# Patient Record
Sex: MALE | Race: WHITE | NOT HISPANIC OR LATINO | Employment: OTHER | URBAN - METROPOLITAN AREA
[De-identification: names, ages, dates, MRNs, and addresses within clinical notes are randomized per-mention and may not be internally consistent; named-entity substitution may affect disease eponyms.]

---

## 2024-10-11 PROBLEM — R14.0 BLOATING: Status: ACTIVE | Noted: 2024-10-11

## 2024-10-11 PROBLEM — R19.7 DIARRHEA IN ADULT PATIENT: Status: ACTIVE | Noted: 2024-10-11

## 2024-10-11 PROCEDURE — 84443 ASSAY THYROID STIM HORMONE: CPT

## 2024-10-11 PROCEDURE — 83516 IMMUNOASSAY NONANTIBODY: CPT

## 2024-10-11 NOTE — H&P (VIEW-ONLY)
"Subjective   Patient ID: Skyler Doan is a 68 y.o. male who was referred by ER providers for New Patient Visit and Abdominal Pain (A lot of gas).    Patient's PCP is Dr. Kirk    PMH: HTN, HLD, prediabetes, bipolar disorder, PTSD    HPI  Patient reports that he has had excessive amounts of gas for years, but worse over the past year. He states that he feels bloated very often. This often occurs after eating in general. He states that he has had a change in stools to diarrhea. This has been happening over the past few months. He states that he is having 3-4 bowel movements daily. No significant nocturnal diarrhea. His PCP prescribed cholestyramine has helped some. He states that he has some generalized abdominal pain, which is cramp like in nature. He denies unexplained weight loss, dysphagia, significant N/V, melena, hematochezia.     Patient was seen in the ER on 7/24/24 for these symptoms. CMP showed elevation of BUN at 26. Lipase, lactate, CBC unrevealing. CT A/P negative for acute findings. Bowel was normal other than diverticulosis.    Social History:  NSAIDs: Denies  Tobacco: Denies  Alcohol: Reports \"often\"  Drug use: Denies     Family History: Denies     Prior GI evaluation:  Colonoscopy 2017: normal     Review of Systems:  Constitutional: No reported fever, chills, or weight loss.  Skin: No reported icterus, lesions, or rash.  Eye: No reported itching, pain, vision changes.  Ear: No reported discharge, hearing loss, or pain.  Nose: No reported congestion, discharge, or epistaxis.  Mouth/throat: No reported dysphagia, hoarseness, or throat pain.  Resp: No reported cough, dyspnea, or wheezing.  Cardiovascular: No reported chest pain, lower extremity edema, or palpitations.   GI: +bloating, abdominal pain, diarrhea  : No reported dysuria, hematuria, or frequency.  Neuro: No reported confusion, memory loss, headaches, or dizziness.  Psych: No reported anxiety, depression, or insomnia.  Musculoskeletal: No " reported arthralgia, joint swelling, or myalgias.  Heme/lymph: No reported easy bleeding or bruising, or swollen lymph nodes.  Endocrine: No reported cold/heat intolerance, polydipsia, or polyuria.     Medications:  Prior to Admission medications    Medication Sig Start Date End Date Taking? Authorizing Provider   acetaminophen (Tylenol) 325 mg tablet Take 1 tablet (325 mg) by mouth every 4 hours if needed for moderate pain (4 - 6). 7/19/16   Historical Provider, MD   aspirin 81 mg chewable tablet Chew 1 tablet (81 mg) once daily. 7/19/16   Historical Provider, MD   atorvastatin (Lipitor) 40 mg tablet Take 1 tablet (40 mg) by mouth once daily. 7/1/24 7/1/25  Virginia M Factor, DO   buPROPion XL (Wellbutrin XL) 300 mg 24 hr tablet Take 1 tablet (300 mg) by mouth once daily in the morning. 3/28/23   Historical Provider, MD   busPIRone (Buspar) 30 mg tablet Take 1 tablet (30 mg) by mouth 2 times a day. 5/3/16   Historical Provider, MD   carvedilol (Coreg) 6.25 mg tablet TAKE ONE TABLET BY MOUTH TWICE DAILY 12/8/23   [facility], DO   cholestyramine (Questran) 4 gram packet Take 1 packet (4 g) by mouth once daily with breakfast. For irritable bowel syndrome 7/25/24 7/25/25  Virginia M Factor, DO   doxepin (SINEquan) 10 mg capsule TAKE ONE CAPSULES BY MOUTH EVERY NIGHT AT BEDTIME AS NEEDED FOR SLEEP 11/21/23   Virginia M Factor, DO   lamoTRIgine (LaMICtal) 200 mg tablet Take 2 tablets (400 mg) by mouth once daily in the morning. Take before meals. 5/3/16   Historical Provider, MD   lamoTRIgine (LaMICtal) 25 mg tablet Take 1 tablet (25 mg) by mouth once daily. 11/17/23   Historical Provider, MD   lisinopriL-hydrochlorothiazide 20-12.5 mg tablet TAKE ONE TABLET BY MOUTH EVERY DAY 12/8/23   Virginia M Factor, DO   mirtazapine (Remeron) 7.5 mg tablet Take 1 tablet (7.5 mg) by mouth once daily at bedtime. 7/15/24   Historical Provider, MD   multivitamin (Multiple Vitamins) tablet Take 1 tablet by mouth once daily.  7/19/16   Historical Provider, MD   sertraline (Zoloft) 100 mg tablet Take 1 tablet (100 mg) by mouth once daily. 7/19/16   Historical Provider, MD   topiramate (Topamax) 200 mg tablet TAKE ONE TABLET BY MOUTH EVERY NIGHT AT BEDTIME 12/8/23   Elaina Kirk DO   traZODone (Desyrel) 300 mg tablet Take 1 tablet (300 mg) by mouth once daily at bedtime. 11/17/23   Historical Provider, MD   traZODone (Desyrel) 50 mg tablet Take 1 tablet (50 mg) by mouth. 11/1/16   Historical Provider, MD       Allergies:  Patient has no known allergies.    Objective   Physical exam:  Constitutional: Well developed, well nourished 68 y.o. year old in no acute distress.   Skin: Warm and dry. Normal turgor. No rash, ulcer, trauma, jaundice.   Eyes: Pupils symmetric and reactive to light.  Respiratory: Clear to auscultation bilaterally. No wheezes, rhonchi, or rales heard.  Cardiovascular: Regular rate and rhythm. S1 and S2 appreciated and normal. No murmur, rub, or gallop heard.   Edema: No edema noted.  GI: Normal bowel sounds. Soft, non-distended, non-tender. No rebound or guarding present. No hepatomegaly or splenomegaly appreciated. Abdominal aorta not palpably abnormal.  Musculoskeletal: Limbs and Joints grossly normal. Full range of motion in major joint.   Neuro: Alert and oriented x 3. Cranial nerves 2-12 grossly intact.   Psych: Normal mood and affect.        Relevant Results Recent labs reviewed in the EMR.    Radiology: Reviewed imaging reviewed in the EMR.  No results found.    Assessment/Plan   Problem List Items Addressed This Visit             ICD-10-CM    Change in bowel habits - Primary R19.4     Patient with change in bowel habits over the past few months to a year. Colonoscopy ordered to ensure no colon polyps or malignancy.         Relevant Orders    C-Reactive Protein    Pancreatic Elastase, Fecal    Calprotectin, Fecal    Tissue Transglutaminase IgA (Completed)    TSH with reflex to Free T4 if abnormal (Completed)     Colonoscopy Diagnostic    Diarrhea in adult patient R19.7     Patient with several episodes of diarrhea daily. Ddx includes celiac disease, inflammatory process (IBD or microscopic colitis), pancreatic insufficiency, thyroid abnormality, IBS. Infection less likely given how long symptoms have been ongoing for. I ordered chronic diarrhea labs and stool studies. Cholestyramine has helped some, recommended fiber supplement and imodium as needed. Colonoscopy ordered as well.         Relevant Orders    C-Reactive Protein    Pancreatic Elastase, Fecal    Calprotectin, Fecal    Tissue Transglutaminase IgA (Completed)    TSH with reflex to Free T4 if abnormal (Completed)    Colonoscopy Diagnostic    Bloating R14.0     May be related to IBS or lactose intolerance. Recommended dairy elimination or lactaid. If work up negative, could consider SIBO testing.         Relevant Orders    C-Reactive Protein    Pancreatic Elastase, Fecal    Calprotectin, Fecal    Tissue Transglutaminase IgA (Completed)    TSH with reflex to Free T4 if abnormal (Completed)    Colonoscopy Diagnostic       Meds to hold: none  Mily William PA-C

## 2024-10-24 ENCOUNTER — LAB (OUTPATIENT)
Dept: LAB | Facility: LAB | Age: 69
End: 2024-10-24
Payer: COMMERCIAL

## 2024-10-24 DIAGNOSIS — R14.0 BLOATING: ICD-10-CM

## 2024-10-24 DIAGNOSIS — R19.7 DIARRHEA IN ADULT PATIENT: ICD-10-CM

## 2024-10-24 DIAGNOSIS — R19.4 CHANGE IN BOWEL HABITS: ICD-10-CM

## 2024-10-24 PROCEDURE — 82653 EL-1 FECAL QUANTITATIVE: CPT

## 2024-10-24 PROCEDURE — 36415 COLL VENOUS BLD VENIPUNCTURE: CPT

## 2024-10-24 PROCEDURE — 83993 ASSAY FOR CALPROTECTIN FECAL: CPT

## 2024-10-29 LAB — CALPROTECTIN STL-MCNT: 32 UG/G

## 2024-11-03 LAB — ELASTASE PANC STL-MCNT: >800 UG/G

## 2024-11-05 ENCOUNTER — PREP FOR PROCEDURE (OUTPATIENT)
Dept: GASTROENTEROLOGY | Facility: CLINIC | Age: 69
End: 2024-11-05
Payer: COMMERCIAL

## 2024-11-06 ENCOUNTER — ANESTHESIA EVENT (OUTPATIENT)
Dept: GASTROENTEROLOGY | Facility: HOSPITAL | Age: 69
End: 2024-11-06
Payer: COMMERCIAL

## 2024-11-08 ENCOUNTER — ANESTHESIA (OUTPATIENT)
Dept: GASTROENTEROLOGY | Facility: HOSPITAL | Age: 69
End: 2024-11-08
Payer: COMMERCIAL

## 2024-11-08 ENCOUNTER — HOSPITAL ENCOUNTER (OUTPATIENT)
Dept: GASTROENTEROLOGY | Facility: HOSPITAL | Age: 69
Discharge: HOME | End: 2024-11-08
Payer: COMMERCIAL

## 2024-11-08 VITALS
RESPIRATION RATE: 15 BRPM | SYSTOLIC BLOOD PRESSURE: 124 MMHG | WEIGHT: 277.78 LBS | OXYGEN SATURATION: 94 % | HEIGHT: 68 IN | BODY MASS INDEX: 42.1 KG/M2 | HEART RATE: 59 BPM | DIASTOLIC BLOOD PRESSURE: 80 MMHG | TEMPERATURE: 98.1 F

## 2024-11-08 DIAGNOSIS — R19.4 CHANGE IN BOWEL HABITS: ICD-10-CM

## 2024-11-08 DIAGNOSIS — R19.7 DIARRHEA IN ADULT PATIENT: ICD-10-CM

## 2024-11-08 DIAGNOSIS — R14.0 BLOATING: ICD-10-CM

## 2024-11-08 PROCEDURE — 7100000010 HC PHASE TWO TIME - EACH INCREMENTAL 1 MINUTE

## 2024-11-08 PROCEDURE — 2720000007 HC OR 272 NO HCPCS

## 2024-11-08 PROCEDURE — 45380 COLONOSCOPY AND BIOPSY: CPT | Performed by: INTERNAL MEDICINE

## 2024-11-08 PROCEDURE — 3700000002 HC GENERAL ANESTHESIA TIME - EACH INCREMENTAL 1 MINUTE

## 2024-11-08 PROCEDURE — 3700000001 HC GENERAL ANESTHESIA TIME - INITIAL BASE CHARGE

## 2024-11-08 PROCEDURE — 45385 COLONOSCOPY W/LESION REMOVAL: CPT | Performed by: INTERNAL MEDICINE

## 2024-11-08 PROCEDURE — 2500000004 HC RX 250 GENERAL PHARMACY W/ HCPCS (ALT 636 FOR OP/ED): Performed by: NURSE ANESTHETIST, CERTIFIED REGISTERED

## 2024-11-08 PROCEDURE — 7100000009 HC PHASE TWO TIME - INITIAL BASE CHARGE

## 2024-11-08 RX ORDER — FENTANYL CITRATE 50 UG/ML
INJECTION, SOLUTION INTRAMUSCULAR; INTRAVENOUS AS NEEDED
Status: DISCONTINUED | OUTPATIENT
Start: 2024-11-08 | End: 2024-11-08

## 2024-11-08 RX ORDER — SODIUM CHLORIDE 0.9 % (FLUSH) 0.9 %
SYRINGE (ML) INJECTION AS NEEDED
Status: DISCONTINUED | OUTPATIENT
Start: 2024-11-08 | End: 2024-11-08

## 2024-11-08 RX ORDER — PROPOFOL 10 MG/ML
INJECTION, EMULSION INTRAVENOUS AS NEEDED
Status: DISCONTINUED | OUTPATIENT
Start: 2024-11-08 | End: 2024-11-08

## 2024-11-08 SDOH — HEALTH STABILITY: MENTAL HEALTH: CURRENT SMOKER: 0

## 2024-11-08 ASSESSMENT — PAIN - FUNCTIONAL ASSESSMENT: PAIN_FUNCTIONAL_ASSESSMENT: 0-10

## 2024-11-08 ASSESSMENT — COLUMBIA-SUICIDE SEVERITY RATING SCALE - C-SSRS
1. IN THE PAST MONTH, HAVE YOU WISHED YOU WERE DEAD OR WISHED YOU COULD GO TO SLEEP AND NOT WAKE UP?: NO
2. HAVE YOU ACTUALLY HAD ANY THOUGHTS OF KILLING YOURSELF?: NO

## 2024-11-08 ASSESSMENT — PAIN SCALES - GENERAL
PAINLEVEL_OUTOF10: 0 - NO PAIN
PAINLEVEL_OUTOF10: 0 - NO PAIN
PAIN_LEVEL: 0
PAINLEVEL_OUTOF10: 0 - NO PAIN

## 2024-11-08 NOTE — LETTER
November 13, 2024     Bandar WARNER Diego  [address] [address]      Dear Mr. Cortez:    Below are the results from your recent visit:    The biopsies from your recent colonoscopy show no inflammatory changes or any evidence of colitis.        The polyp that I removed during your recent colonoscopy was a tubular adenoma on pathology.  This type of polyp is not a cancer, but it is the type of polyp that could grow into a cancer over time.  Any polyps that were removed will not grow into a cancer, but having polyps like this can increase your risk of developing other polyps or eventually a cancer.  Because of that risk I would recommend that you have another colonoscopy in about 3 years to look for other polyps.     If you have any other questions or concerns please do not hesitate to call me at my office at [phone].     Repeat Colonoscopy Interval: 3 years    Sincerely,        Teo Capps MD                    Resulted Orders   Surgical Pathology Exam   Result Value Ref Range    Case Report       Surgical Pathology                                Case: [id number]                                  Authorizing Provider:  Teo Capps MD        Collected:           11/08/2024 1137              Ordering Location:     [facility]    Received:            11/08/2024 1346                                     [facility]                                                                     Pathologist:           Skyler Hutson MD                                                             Specimens:   A) - COLON  - RANDOM BIOPSY, r/o microscopic colitis                                                B) - RECTUM POLYPECTOMY                                                                    FINAL DIAGNOSIS       A.  COLON, RANDOM BIOPSY:  - COLONIC MUCOSA, NO SIGNIFICANT HISTOPATHOLOGICAL ABNORMALITY.    B.  RECTUM, POLYPECTOMY:  - TUBULAR ADENOMA.              By the signature on this report, the  individual or group listed as making the Final Interpretation/Diagnosis certifies that they have reviewed this case.       Clinical History       Change in bowel habits [R19.4]  Diarrhea in adult patient [R19.7]  Bloating [R14.0]    A) Rule out Microscopic Colitis       Gross Description       A: Received in formalin, labeled with the patient's name and hospital number, are multiple fragments of tan, soft tissue aggregating to 0.7 x 0.2 x 0.1 cm. The specimen is submitted in toto in one cassette.  TRACEY  B: Received in formalin, labeled with the patient's name and hospital number, is 1 fragment of tan, soft tissue measuring 0.9 x 0.7 x 0.5 cm. The specimen is submitted in toto in one cassette.  TRACEY

## 2024-11-08 NOTE — ANESTHESIA PREPROCEDURE EVALUATION
Patient: Bandar WARNER Diego    Procedure Information       Anesthesia Start Date/Time: 11/08/24 1123    Scheduled providers: Teo Capps MD    Procedure: COLONOSCOPY    Location: [facility]            Relevant Problems   Anesthesia (within normal limits)      Cardiac   (+) Benign essential hypertension   (+) Mixed hyperlipidemia      Pulmonary (within normal limits)      Neuro   (+) Bipolar disorder, current episode depressed, moderate (Multi)   (+) Major depressive disorder, recurrent episode, moderate   (+) Post-traumatic stress disorder, chronic      GI   (+) Irritable bowel syndrome with diarrhea      /Renal (within normal limits)      Liver (within normal limits)      Endocrine (within normal limits)      Musculoskeletal (within normal limits)      HEENT (within normal limits)       Clinical information reviewed:   Tobacco  Allergies  Meds   Med Hx  Surg Hx   Fam Hx  Soc Hx        NPO Detail:  NPO/Void Status  Date of Last Liquid: 11/08/24  Time of Last Liquid: 0900  Date of Last Solid: 11/06/24  Time of Last Solid: 1800  Last Intake Type: Clear fluids         Physical Exam    Airway  Mallampati: III  TM distance: >3 FB  Neck ROM: full     Cardiovascular - normal exam  Rhythm: regular  Rate: normal     Dental   Comments: POOR DENTITION   Pulmonary - normal exam     Abdominal - normal exam             Anesthesia Plan    History of general anesthesia?: yes  History of complications of general anesthesia?: no    ASA 3     MAC     The patient is not a current smoker.    intravenous induction   Anesthetic plan and risks discussed with patient.

## 2024-11-08 NOTE — ANESTHESIA POSTPROCEDURE EVALUATION
Patient: Bandar WARNER Digeo    Procedure Summary       Date: 11/08/24 Room / Location: [facility]    Anesthesia Start: 1123 Anesthesia Stop: 1155    Procedure: COLONOSCOPY Diagnosis:       Change in bowel habits      Diarrhea in adult patient      Bloating    Scheduled Providers: Teo Capps MD Responsible Provider: JOHN Segovia    Anesthesia Type: MAC ASA Status: 3            Anesthesia Type: MAC    Vitals Value Taken Time   /74 11/08/24 1157   Temp 37.1 °C (98.8 °F) 11/08/24 1157   Pulse 61 11/08/24 1157   Resp 16 11/08/24 1157   SpO2 93 % 11/08/24 1157       Anesthesia Post Evaluation    Patient location during evaluation: bedside  Patient participation: complete - patient participated  Level of consciousness: awake and alert  Pain score: 0  Pain management: adequate  Airway patency: patent  Cardiovascular status: acceptable and hemodynamically stable  Respiratory status: acceptable  Hydration status: acceptable  Postoperative Nausea and Vomiting: none        There were no known notable events for this encounter.

## 2024-11-11 DIAGNOSIS — G47.00 INSOMNIA, UNSPECIFIED TYPE: ICD-10-CM

## 2024-11-11 RX ORDER — DOXEPIN HYDROCHLORIDE 10 MG/1
CAPSULE ORAL
Qty: 30 CAPSULE | Refills: 2 | Status: SHIPPED | OUTPATIENT
Start: 2024-11-11

## 2024-11-11 NOTE — ADDENDUM NOTE
Encounter addended by: Safia Vega RN on: 11/11/2024 10:17 AM   Actions taken: Contacts section saved, Flowsheet accepted

## 2024-11-13 ENCOUNTER — TELEPHONE (OUTPATIENT)
Dept: GASTROENTEROLOGY | Facility: CLINIC | Age: 69
End: 2024-11-13
Payer: COMMERCIAL

## 2024-11-13 LAB
LABORATORY COMMENT REPORT: NORMAL
PATH REPORT.FINAL DX SPEC: NORMAL
PATH REPORT.GROSS SPEC: NORMAL
PATH REPORT.RELEVANT HX SPEC: NORMAL
PATH REPORT.TOTAL CANCER: NORMAL

## 2024-11-13 NOTE — TELEPHONE ENCOUNTER
----- Message from Hebert Bryant sent at 11/13/2024 10:47 AM EST -----    The biopsies from your recent colonoscopy show no inflammatory changes or any evidence of colitis.      The polyp that I removed during your recent colonoscopy was a tubular adenoma on pathology.  This type of polyp is not a cancer, but it is the type of polyp that could grow into a cancer over time.  Any polyps that were removed will not grow into a cancer, but having polyps like this can increase your risk of developing other polyps or eventually a cancer.  Because of that risk I would recommend that you have another colonoscopy in about 3 years to look for other polyps.    If you have any other questions or concerns please do not hesitate to call me at my office at [phone].    Repeat Colonoscopy Interval: 3 years

## 2024-11-13 NOTE — RESULT ENCOUNTER NOTE
The biopsies from your recent colonoscopy show no inflammatory changes or any evidence of colitis.      The polyp that I removed during your recent colonoscopy was a tubular adenoma on pathology.  This type of polyp is not a cancer, but it is the type of polyp that could grow into a cancer over time.  Any polyps that were removed will not grow into a cancer, but having polyps like this can increase your risk of developing other polyps or eventually a cancer.  Because of that risk I would recommend that you have another colonoscopy in about 3 years to look for other polyps.    If you have any other questions or concerns please do not hesitate to call me at my office at [phone].    Repeat Colonoscopy Interval: 3 years

## 2025-02-03 ENCOUNTER — APPOINTMENT (OUTPATIENT)
Dept: PRIMARY CARE | Facility: CLINIC | Age: 70
End: 2025-02-03
Payer: COMMERCIAL

## 2025-02-03 VITALS
BODY MASS INDEX: 41.19 KG/M2 | OXYGEN SATURATION: 96 % | TEMPERATURE: 96.6 F | HEIGHT: 68 IN | RESPIRATION RATE: 20 BRPM | HEART RATE: 67 BPM | SYSTOLIC BLOOD PRESSURE: 122 MMHG | DIASTOLIC BLOOD PRESSURE: 80 MMHG | WEIGHT: 271.8 LBS

## 2025-02-03 DIAGNOSIS — E55.9 VITAMIN D DEFICIENCY: Primary | ICD-10-CM

## 2025-02-03 DIAGNOSIS — Z23 NEED FOR PNEUMOCOCCAL 20-VALENT CONJUGATE VACCINATION: ICD-10-CM

## 2025-02-03 PROCEDURE — 3079F DIAST BP 80-89 MM HG: CPT

## 2025-02-03 PROCEDURE — 3074F SYST BP LT 130 MM HG: CPT

## 2025-02-03 PROCEDURE — G2211 COMPLEX E/M VISIT ADD ON: HCPCS

## 2025-02-03 PROCEDURE — G0009 ADMIN PNEUMOCOCCAL VACCINE: HCPCS

## 2025-02-03 PROCEDURE — 1159F MED LIST DOCD IN RCRD: CPT

## 2025-02-03 PROCEDURE — 1126F AMNT PAIN NOTED NONE PRSNT: CPT

## 2025-02-03 PROCEDURE — 1123F ACP DISCUSS/DSCN MKR DOCD: CPT

## 2025-02-03 PROCEDURE — 99214 OFFICE O/P EST MOD 30 MIN: CPT

## 2025-02-03 PROCEDURE — 90677 PCV20 VACCINE IM: CPT

## 2025-02-03 PROCEDURE — 3008F BODY MASS INDEX DOCD: CPT

## 2025-02-03 PROCEDURE — 1036F TOBACCO NON-USER: CPT

## 2025-02-03 PROCEDURE — 1160F RVW MEDS BY RX/DR IN RCRD: CPT

## 2025-02-03 ASSESSMENT — PATIENT HEALTH QUESTIONNAIRE - PHQ9
SUM OF ALL RESPONSES TO PHQ9 QUESTIONS 1 & 2: 4
5. POOR APPETITE OR OVEREATING: SEVERAL DAYS
7. TROUBLE CONCENTRATING ON THINGS, SUCH AS READING THE NEWSPAPER OR WATCHING TELEVISION: NEARLY EVERY DAY
4. FEELING TIRED OR HAVING LITTLE ENERGY: MORE THAN HALF THE DAYS
3. TROUBLE FALLING OR STAYING ASLEEP: MORE THAN HALF THE DAYS
9. THOUGHTS THAT YOU WOULD BE BETTER OFF DEAD, OR OF HURTING YOURSELF: SEVERAL DAYS
8. MOVING OR SPEAKING SO SLOWLY THAT OTHER PEOPLE COULD HAVE NOTICED. OR THE OPPOSITE, BEING SO FIGETY OR RESTLESS THAT YOU HAVE BEEN MOVING AROUND A LOT MORE THAN USUAL: MORE THAN HALF THE DAYS
6. FEELING BAD ABOUT YOURSELF - OR THAT YOU ARE A FAILURE OR HAVE LET YOURSELF OR YOUR FAMILY DOWN: SEVERAL DAYS
SUM OF ALL RESPONSES TO PHQ QUESTIONS 1-9: 16
10. IF YOU CHECKED OFF ANY PROBLEMS, HOW DIFFICULT HAVE THESE PROBLEMS MADE IT FOR YOU TO DO YOUR WORK, TAKE CARE OF THINGS AT HOME, OR GET ALONG WITH OTHER PEOPLE: VERY DIFFICULT
1. LITTLE INTEREST OR PLEASURE IN DOING THINGS: MORE THAN HALF THE DAYS
2. FEELING DOWN, DEPRESSED OR HOPELESS: MORE THAN HALF THE DAYS

## 2025-02-03 ASSESSMENT — ANXIETY QUESTIONNAIRES
1. FEELING NERVOUS, ANXIOUS, OR ON EDGE: MORE THAN HALF THE DAYS
4. TROUBLE RELAXING: NOT AT ALL
7. FEELING AFRAID AS IF SOMETHING AWFUL MIGHT HAPPEN: SEVERAL DAYS
3. WORRYING TOO MUCH ABOUT DIFFERENT THINGS: MORE THAN HALF THE DAYS
2. NOT BEING ABLE TO STOP OR CONTROL WORRYING: MORE THAN HALF THE DAYS
IF YOU CHECKED OFF ANY PROBLEMS ON THIS QUESTIONNAIRE, HOW DIFFICULT HAVE THESE PROBLEMS MADE IT FOR YOU TO DO YOUR WORK, TAKE CARE OF THINGS AT HOME, OR GET ALONG WITH OTHER PEOPLE: VERY DIFFICULT
GAD7 TOTAL SCORE: 10
6. BECOMING EASILY ANNOYED OR IRRITABLE: MORE THAN HALF THE DAYS
5. BEING SO RESTLESS THAT IT IS HARD TO SIT STILL: SEVERAL DAYS

## 2025-02-03 ASSESSMENT — ENCOUNTER SYMPTOMS
OCCASIONAL FEELINGS OF UNSTEADINESS: 0
GASTROINTESTINAL NEGATIVE: 1
LOSS OF SENSATION IN FEET: 0
NERVOUS/ANXIOUS: 1
RESPIRATORY NEGATIVE: 1
DEPRESSION: 1
CARDIOVASCULAR NEGATIVE: 1
NEUROLOGICAL NEGATIVE: 1
AGITATION: 1

## 2025-02-03 ASSESSMENT — LIFESTYLE VARIABLES
HOW OFTEN DO YOU HAVE SIX OR MORE DRINKS ON ONE OCCASION: NEVER
HOW OFTEN DO YOU HAVE A DRINK CONTAINING ALCOHOL: NEVER
HOW MANY STANDARD DRINKS CONTAINING ALCOHOL DO YOU HAVE ON A TYPICAL DAY: PATIENT DOES NOT DRINK
SKIP TO QUESTIONS 9-10: 1
AUDIT-C TOTAL SCORE: 0

## 2025-02-03 ASSESSMENT — PAIN SCALES - GENERAL: PAINLEVEL_OUTOF10: 0-NO PAIN

## 2025-02-03 NOTE — PROGRESS NOTES
"Subjective   Patient ID: Bandar Cortez is a 69 y.o. male who presents for Depression (Feeling down.  Was in his home most of January.  Does have thoughts of hurting self but no plan to carry it out. ).    HPI     Bandar presents for depression. He endorses that he feels as though it is seasonal- he does follow with behavioral health routinely, next appointment at beginning of March. He is agreeable to get labs drawn for vitamin D level.  Bandar endorses that suicide has crossed his mind in the past, but he has never thought of a plan to carry out. We discussed the importance of calling 911 if these thoughts do occur, and I recommended seeing if psych can get him in sooner than March for an appointment, as they manage his mental health medications.   He is agreeable to getting prevnar 20 vaccine at this time.  Follow up with [facility] as scheduled and sooner as needed.  Will reach out with lab results.     Review of Systems   Respiratory: Negative.     Cardiovascular: Negative.    Gastrointestinal: Negative.    Genitourinary: Negative.    Skin: Negative.    Neurological: Negative.    Psychiatric/Behavioral:  Positive for agitation. The patient is nervous/anxious.        Objective   /80 (BP Location: Left arm, Patient Position: Sitting, BP Cuff Size: Large adult)   Pulse 67   Temp 35.9 °C (96.6 °F) (Temporal)   Resp 20   Ht 1.727 m (5' 8\")   Wt 123 kg (271 lb 12.8 oz)   SpO2 96%   BMI 41.33 kg/m²     Physical Exam  Cardiovascular:      Rate and Rhythm: Normal rate and regular rhythm.      Pulses: Normal pulses.      Heart sounds: Normal heart sounds.   Pulmonary:      Effort: Pulmonary effort is normal.      Breath sounds: Normal breath sounds.   Musculoskeletal:         General: Normal range of motion.   Skin:     General: Skin is warm and dry.      Capillary Refill: Capillary refill takes less than 2 seconds.   Neurological:      Mental Status: He is oriented to person, place, and time.   Psychiatric:         Mood " and Affect: Mood normal.         Behavior: Behavior normal.         Thought Content: Thought content normal.         Judgment: Judgment normal.         Assessment/Plan   Problem List Items Addressed This Visit             ICD-10-CM    Vitamin D deficiency - Primary E55.9    Relevant Orders    Vitamin D 25-Hydroxy,Total (for eval of Vitamin D levels)    Need for pneumococcal 20-valent conjugate vaccination Z23    Relevant Orders    Pneumococcal conjugate vaccine, 20-valent (PREVNAR 20) (Completed)

## 2025-02-03 NOTE — PATIENT INSTRUCTIONS
Thank you for coming to see me today.  If you have any questions or concerns following our visit, please contact the office.  Phone: [phone]    Follow up with Dr. Kirk as scheduled    1)  Complete lab work- I will reach out with results

## 2025-02-05 DIAGNOSIS — G47.00 INSOMNIA, UNSPECIFIED TYPE: ICD-10-CM

## 2025-02-05 RX ORDER — DOXEPIN HYDROCHLORIDE 10 MG/1
CAPSULE ORAL
Qty: 30 CAPSULE | Refills: 2 | Status: SHIPPED | OUTPATIENT
Start: 2025-02-05

## 2025-04-17 ENCOUNTER — HOSPITAL ENCOUNTER (EMERGENCY)
Facility: HOSPITAL | Age: 70
Discharge: HOME | End: 2025-04-17
Payer: COMMERCIAL

## 2025-04-17 ENCOUNTER — APPOINTMENT (OUTPATIENT)
Dept: RADIOLOGY | Facility: HOSPITAL | Age: 70
End: 2025-04-17
Payer: COMMERCIAL

## 2025-04-17 VITALS
BODY MASS INDEX: 34.86 KG/M2 | RESPIRATION RATE: 20 BRPM | WEIGHT: 230 LBS | SYSTOLIC BLOOD PRESSURE: 131 MMHG | TEMPERATURE: 97 F | HEIGHT: 68 IN | DIASTOLIC BLOOD PRESSURE: 71 MMHG | HEART RATE: 53 BPM | OXYGEN SATURATION: 97 %

## 2025-04-17 DIAGNOSIS — K59.00 CONSTIPATION, UNSPECIFIED CONSTIPATION TYPE: Primary | ICD-10-CM

## 2025-04-17 PROCEDURE — 99283 EMERGENCY DEPT VISIT LOW MDM: CPT

## 2025-04-17 PROCEDURE — 74021 RADEX ABDOMEN 3+ VIEWS: CPT | Performed by: SURGERY

## 2025-04-17 PROCEDURE — 74021 RADEX ABDOMEN 3+ VIEWS: CPT

## 2025-04-17 ASSESSMENT — PAIN - FUNCTIONAL ASSESSMENT: PAIN_FUNCTIONAL_ASSESSMENT: 0-10

## 2025-04-17 ASSESSMENT — PAIN DESCRIPTION - PAIN TYPE: TYPE: ACUTE PAIN

## 2025-04-17 ASSESSMENT — LIFESTYLE VARIABLES
TOTAL SCORE: 0
HAVE YOU EVER FELT YOU SHOULD CUT DOWN ON YOUR DRINKING: NO
EVER HAD A DRINK FIRST THING IN THE MORNING TO STEADY YOUR NERVES TO GET RID OF A HANGOVER: NO
EVER FELT BAD OR GUILTY ABOUT YOUR DRINKING: NO
HAVE PEOPLE ANNOYED YOU BY CRITICIZING YOUR DRINKING: NO

## 2025-04-17 ASSESSMENT — PAIN DESCRIPTION - LOCATION: LOCATION: RECTUM

## 2025-04-17 ASSESSMENT — PAIN SCALES - GENERAL: PAINLEVEL_OUTOF10: 3

## 2025-04-17 NOTE — ED PROVIDER NOTES
"    HPI   Chief Complaint   Patient presents with    Constipation       This is a 69-year-old  male, with a past medical history of hypertension, hyperlipidemia, bipolar disorder, PTSD, irritable bowel syndrome, and obesity, that is presenting to the emergency room with complaints of constipation.  Reports he has not had a bowel movement in the last 3 days.  He has been getting some liquid smearing.  He usually goes every day or every other day.  He endorses a large amount of gas.  He states that he feels like \"the gas is pushing him up over his head\".  The patient is not having any fevers, chills, nausea, vomiting.  Denies any blood in his stool.  He is not having any difficulty urinating.  Denies any changes in his diet.  States he has been taking Imodium as well as diarrheal medication for his symptoms.  He rates his discomfort a 3 out of 10 on the pain scale.  He is not having any chest pain, shortness of breath, dizziness, palpitations, paresthesias, focal weakness.      History provided by:  Patient   used: No                          No data recorded                Patient History   Medical History[1]  Surgical History[2]  Family History[3]  Social History[4]    Physical Exam   Visit Vitals  /71 (BP Location: Left arm, Patient Position: Sitting)   Pulse 53   Temp 36.1 °C (97 °F) (Tympanic)   Resp 20   Ht 1.727 m (5' 8\")   Wt 104 kg (230 lb)   SpO2 97%   BMI 34.97 kg/m²   Smoking Status Former   BSA 2.23 m²      Physical Exam    No orders to display       Labs Reviewed - No data to display      ED Course & MDM   Diagnoses as of 04/17/25 2128   Constipation, unspecified constipation type           Medical Decision Making  The patient was seen and evaluated by the nurse practitioner, Quynh Gloria.  The patient is presenting to the emergency room with complaints of constipation and gas.  Differential diagnosis includes constipation, obstruction, diverticulitis, gas pains, IBS, " or other acute process.  The patient's abdominal exam was benign.  The patient's vital signs were stable.  An x-ray of the abdomen was performed and did not show any obvious signs of ileus or obstruction.  The patient had a moderate amount of air and stool noted throughout the colon.  A soapsuds enema was administered.  The patient had good result.  He reported marked improvement of his symptoms.  The patient was advised to increase his oral fluid intake as well as his fruit and vegetable intake.  The patient is to follow up with their primary care physician in the next 2-3 days.  The patient is to return to the ED worse in any way.  The patient was discharged in stable condition with computer discharge instructions given. Patient was agreeable with discharge planning.           Your medication list        ASK your doctor about these medications        Instructions Last Dose Given Next Dose Due   acetaminophen 325 mg tablet  Commonly known as: Tylenol           aspirin 81 mg chewable tablet           atorvastatin 40 mg tablet  Commonly known as: Lipitor      Take 1 tablet (40 mg) by mouth once daily.       buPROPion  mg 24 hr tablet  Commonly known as: Wellbutrin XL           busPIRone 30 mg tablet  Commonly known as: Buspar           carvedilol 6.25 mg tablet  Commonly known as: Coreg      TAKE ONE TABLET BY MOUTH TWICE DAILY       cholestyramine 4 gram packet  Commonly known as: Questran      Take 1 packet (4 g) by mouth once daily with breakfast. For irritable bowel syndrome       doxepin 10 mg capsule  Commonly known as: SINEquan      TAKE ONE CAPSULES BY MOUTH EVERY NIGHT AT BEDTIME AS NEEDED FOR SLEEP       lamoTRIgine 200 mg tablet  Commonly known as: LaMICtal           lamoTRIgine 25 mg tablet  Commonly known as: LaMICtal           lisinopriL-hydrochlorothiazide 20-12.5 mg tablet      TAKE ONE TABLET BY MOUTH EVERY DAY       mirtazapine 7.5 mg tablet  Commonly known as: Remeron           Multiple  Vitamins tablet  Generic drug: multivitamin           sertraline 100 mg tablet  Commonly known as: Zoloft           topiramate 200 mg tablet  Commonly known as: Topamax      TAKE ONE TABLET BY MOUTH EVERY NIGHT AT BEDTIME       traZODone 50 mg tablet  Commonly known as: Desyrel           traZODone 300 mg tablet  Commonly known as: Desyrel                    Procedure       *This report was transcribed using voice recognition software.  Every effort was made to ensure accuracy; however, inadvertent computerized transcription errors may be present.*  Quynh LOPEZ-JULIAN  04/17/25           [1]   Past Medical History:  Diagnosis Date    Body mass index (BMI) 36.0-36.9, adult 12/01/2020    Body mass index (BMI) of 36.0 to 36.9 in adult    Body mass index (BMI) 39.0-39.9, adult 06/09/2020    Body mass index (BMI) of 39.0 to 39.9 in adult    Disorder of the skin and subcutaneous tissue, unspecified 12/28/2020    Lesion of skin of nose    Hypertension     Local infection of the skin and subcutaneous tissue, unspecified 12/01/2020    Skin infection    Other conditions influencing health status 12/09/2019    Body mass index (BMI) greater than 35    Other hypertrophic disorders of the skin 12/10/2019    Skin tag, acquired    Pain in left hip 05/13/2020    Hip pain, left    Personal history of other diseases of the circulatory system     History of hypertension    Personal history of other diseases of the digestive system     History of irritable bowel syndrome    Personal history of other endocrine, nutritional and metabolic disease     History of hyperlipidemia    Personal history of other mental and behavioral disorders     History of bipolar disorder    Personal history of other mental and behavioral disorders     History of post traumatic stress disorder    Prediabetes     Prediabetes   [2]   Past Surgical History:  Procedure Laterality Date    TONSILLECTOMY  07/13/2017    Tonsillectomy    TOTAL HIP ARTHROPLASTY   07/13/2017    Hip Replacement Right   [3]   Family History  Problem Relation Name Age of Onset    Uterine cancer Mother      Other (cardiac disease) Other      Lung cancer Other      Other (malignant neoplasm) Other     [4]   Social History  Tobacco Use    Smoking status: Former     Types: Cigarettes     Passive exposure: Never    Smokeless tobacco: Never   Vaping Use    Vaping status: Never Used   Substance Use Topics    Alcohol use: Yes     Comment: rarely    Drug use: Never        JESSICA Cerna-CNP  04/17/25 1070

## 2025-04-17 NOTE — ED TRIAGE NOTES
Pt to ER with c/o constipation and rectal pain via EMS. Pt states he hasn't had a bowel movement x 3 days.

## 2025-05-05 DIAGNOSIS — G47.00 INSOMNIA, UNSPECIFIED TYPE: ICD-10-CM

## 2025-05-06 RX ORDER — DOXEPIN HYDROCHLORIDE 10 MG/1
CAPSULE ORAL
Qty: 30 CAPSULE | Refills: 2 | Status: SHIPPED | OUTPATIENT
Start: 2025-05-06

## 2025-05-15 ENCOUNTER — HOSPITAL ENCOUNTER (EMERGENCY)
Facility: HOSPITAL | Age: 70
Discharge: HOME | End: 2025-05-16
Attending: EMERGENCY MEDICINE
Payer: COMMERCIAL

## 2025-05-15 ENCOUNTER — APPOINTMENT (OUTPATIENT)
Dept: RADIOLOGY | Facility: HOSPITAL | Age: 70
End: 2025-05-15
Payer: COMMERCIAL

## 2025-05-15 VITALS
RESPIRATION RATE: 16 BRPM | WEIGHT: 240 LBS | OXYGEN SATURATION: 96 % | HEART RATE: 62 BPM | DIASTOLIC BLOOD PRESSURE: 81 MMHG | TEMPERATURE: 98.2 F | HEIGHT: 68 IN | SYSTOLIC BLOOD PRESSURE: 129 MMHG | BODY MASS INDEX: 36.37 KG/M2

## 2025-05-15 DIAGNOSIS — R19.7 DIARRHEA, UNSPECIFIED TYPE: Primary | ICD-10-CM

## 2025-05-15 LAB
ALBUMIN SERPL BCP-MCNC: 4.1 G/DL (ref 3.4–5)
ALP SERPL-CCNC: 71 U/L (ref 33–136)
ALT SERPL W P-5'-P-CCNC: 19 U/L (ref 10–52)
ANION GAP SERPL CALC-SCNC: 13 MMOL/L (ref 10–20)
APPEARANCE UR: CLEAR
AST SERPL W P-5'-P-CCNC: 16 U/L (ref 9–39)
BASOPHILS # BLD AUTO: 0.05 X10*3/UL (ref 0–0.1)
BASOPHILS NFR BLD AUTO: 0.7 %
BILIRUB SERPL-MCNC: 0.4 MG/DL (ref 0–1.2)
BILIRUB UR STRIP.AUTO-MCNC: NEGATIVE MG/DL
BUN SERPL-MCNC: 9 MG/DL (ref 6–23)
CALCIUM SERPL-MCNC: 9 MG/DL (ref 8.6–10.3)
CHLORIDE SERPL-SCNC: 106 MMOL/L (ref 98–107)
CO2 SERPL-SCNC: 26 MMOL/L (ref 21–32)
COLOR UR: YELLOW
CREAT SERPL-MCNC: 1.16 MG/DL (ref 0.5–1.3)
EGFRCR SERPLBLD CKD-EPI 2021: 68 ML/MIN/1.73M*2
EOSINOPHIL # BLD AUTO: 0.18 X10*3/UL (ref 0–0.7)
EOSINOPHIL NFR BLD AUTO: 2.4 %
ERYTHROCYTE [DISTWIDTH] IN BLOOD BY AUTOMATED COUNT: 13.2 % (ref 11.5–14.5)
GLUCOSE SERPL-MCNC: 102 MG/DL (ref 74–99)
GLUCOSE UR STRIP.AUTO-MCNC: NEGATIVE MG/DL
HCT VFR BLD AUTO: 45.4 % (ref 41–52)
HGB BLD-MCNC: 14.6 G/DL (ref 13.5–17.5)
IMM GRANULOCYTES # BLD AUTO: 0.04 X10*3/UL (ref 0–0.7)
IMM GRANULOCYTES NFR BLD AUTO: 0.5 % (ref 0–0.9)
KETONES UR STRIP.AUTO-MCNC: NEGATIVE MG/DL
LACTATE SERPL-SCNC: 1.8 MMOL/L (ref 0.4–2)
LEUKOCYTE ESTERASE UR QL STRIP.AUTO: NEGATIVE
LYMPHOCYTES # BLD AUTO: 1.79 X10*3/UL (ref 1.2–4.8)
LYMPHOCYTES NFR BLD AUTO: 23.9 %
MCH RBC QN AUTO: 29.1 PG (ref 26–34)
MCHC RBC AUTO-ENTMCNC: 32.2 G/DL (ref 32–36)
MCV RBC AUTO: 90 FL (ref 80–100)
MONOCYTES # BLD AUTO: 0.57 X10*3/UL (ref 0.1–1)
MONOCYTES NFR BLD AUTO: 7.6 %
NEUTROPHILS # BLD AUTO: 4.86 X10*3/UL (ref 1.2–7.7)
NEUTROPHILS NFR BLD AUTO: 64.9 %
NITRITE UR QL STRIP.AUTO: NEGATIVE
NRBC BLD-RTO: 0 /100 WBCS (ref 0–0)
PH UR STRIP.AUTO: 7 [PH]
PLATELET # BLD AUTO: 245 X10*3/UL (ref 150–450)
POTASSIUM SERPL-SCNC: 4.2 MMOL/L (ref 3.5–5.3)
PROT SERPL-MCNC: 6.7 G/DL (ref 6.4–8.2)
PROT UR STRIP.AUTO-MCNC: NEGATIVE MG/DL
RBC # BLD AUTO: 5.02 X10*6/UL (ref 4.5–5.9)
RBC # UR STRIP.AUTO: NEGATIVE MG/DL
SODIUM SERPL-SCNC: 141 MMOL/L (ref 136–145)
SP GR UR STRIP.AUTO: 1.01
UROBILINOGEN UR STRIP.AUTO-MCNC: NORMAL MG/DL
WBC # BLD AUTO: 7.5 X10*3/UL (ref 4.4–11.3)

## 2025-05-15 PROCEDURE — 74177 CT ABD & PELVIS W/CONTRAST: CPT | Performed by: STUDENT IN AN ORGANIZED HEALTH CARE EDUCATION/TRAINING PROGRAM

## 2025-05-15 PROCEDURE — 84075 ASSAY ALKALINE PHOSPHATASE: CPT | Performed by: NURSE PRACTITIONER

## 2025-05-15 PROCEDURE — 2500000004 HC RX 250 GENERAL PHARMACY W/ HCPCS (ALT 636 FOR OP/ED): Mod: JZ | Performed by: NURSE PRACTITIONER

## 2025-05-15 PROCEDURE — 36415 COLL VENOUS BLD VENIPUNCTURE: CPT | Performed by: NURSE PRACTITIONER

## 2025-05-15 PROCEDURE — 96374 THER/PROPH/DIAG INJ IV PUSH: CPT | Mod: 59

## 2025-05-15 PROCEDURE — 99285 EMERGENCY DEPT VISIT HI MDM: CPT | Mod: 25 | Performed by: EMERGENCY MEDICINE

## 2025-05-15 PROCEDURE — 96361 HYDRATE IV INFUSION ADD-ON: CPT

## 2025-05-15 PROCEDURE — 74177 CT ABD & PELVIS W/CONTRAST: CPT

## 2025-05-15 PROCEDURE — 83605 ASSAY OF LACTIC ACID: CPT | Performed by: NURSE PRACTITIONER

## 2025-05-15 PROCEDURE — 85025 COMPLETE CBC W/AUTO DIFF WBC: CPT | Performed by: NURSE PRACTITIONER

## 2025-05-15 PROCEDURE — 81003 URINALYSIS AUTO W/O SCOPE: CPT | Performed by: NURSE PRACTITIONER

## 2025-05-15 PROCEDURE — 2550000001 HC RX 255 CONTRASTS: Mod: JZ | Performed by: NURSE PRACTITIONER

## 2025-05-15 RX ORDER — KETOROLAC TROMETHAMINE 30 MG/ML
15 INJECTION, SOLUTION INTRAMUSCULAR; INTRAVENOUS ONCE
Status: COMPLETED | OUTPATIENT
Start: 2025-05-15 | End: 2025-05-15

## 2025-05-15 RX ADMIN — SODIUM CHLORIDE 1000 ML: 0.9 INJECTION, SOLUTION INTRAVENOUS at 20:53

## 2025-05-15 RX ADMIN — IOHEXOL 75 ML: 350 INJECTION, SOLUTION INTRAVENOUS at 21:44

## 2025-05-15 RX ADMIN — KETOROLAC TROMETHAMINE 15 MG: 30 INJECTION, SOLUTION INTRAMUSCULAR at 21:27

## 2025-05-15 ASSESSMENT — COLUMBIA-SUICIDE SEVERITY RATING SCALE - C-SSRS
1. IN THE PAST MONTH, HAVE YOU WISHED YOU WERE DEAD OR WISHED YOU COULD GO TO SLEEP AND NOT WAKE UP?: NO
2. HAVE YOU ACTUALLY HAD ANY THOUGHTS OF KILLING YOURSELF?: NO
6. HAVE YOU EVER DONE ANYTHING, STARTED TO DO ANYTHING, OR PREPARED TO DO ANYTHING TO END YOUR LIFE?: NO

## 2025-05-15 ASSESSMENT — PAIN - FUNCTIONAL ASSESSMENT
PAIN_FUNCTIONAL_ASSESSMENT: 0-10
PAIN_FUNCTIONAL_ASSESSMENT: 0-10

## 2025-05-15 ASSESSMENT — PAIN SCALES - GENERAL
PAINLEVEL_OUTOF10: 0 - NO PAIN
PAINLEVEL_OUTOF10: 4

## 2025-05-16 LAB — HOLD SPECIMEN: NORMAL

## 2025-05-16 NOTE — ED PROVIDER NOTES
EMERGENCY MEDICINE SIGNOUT NOTE    Signout   Patient received in signout from previous provider pending CT imaging results.  Patient presented to the emergency department today with chief complaint of diarrhea and some abdominal bloating.  Patient has been seen for this in the past for that he was noted to be constipated and received an enema.  Patient had diarrhea for about a week but had some bloating so was concern for potential abnormalities.  Please see previous providers note for full history.     Brief Physical Exam   Repeat abdominal examination showed benign abdomen.  Normal heart and lungs.     Results     Labs Reviewed   COMPREHENSIVE METABOLIC PANEL - Abnormal       Result Value    Glucose 102 (*)     Sodium 141      Potassium 4.2      Chloride 106      Bicarbonate 26      Anion Gap 13      Urea Nitrogen 9      Creatinine 1.16      eGFR 68      Calcium 9.0      Albumin 4.1      Alkaline Phosphatase 71      Total Protein 6.7      AST 16      Bilirubin, Total 0.4      ALT 19     LACTATE - Normal    Lactate 1.8      Narrative:     Venipuncture immediately after or during the administration of Metamizole may lead to falsely low results. Testing should be performed immediately prior to Metamizole dosing.   URINALYSIS WITH REFLEX CULTURE AND MICROSCOPIC - Normal    Color, Urine Yellow      Appearance, Urine Clear      Specific Gravity, Urine 1.010      pH, Urine 7.0      Protein, Urine NEGATIVE      Glucose, Urine NEGATIVE      Blood, Urine NEGATIVE      Ketones, Urine NEGATIVE      Bilirubin, Urine NEGATIVE      Urobilinogen, Urine NORM      Nitrite, Urine NEGATIVE      Leukocyte Esterase, Urine NEGATIVE     CBC WITH AUTO DIFFERENTIAL    WBC 7.5      nRBC 0.0      RBC 5.02      Hemoglobin 14.6      Hematocrit 45.4      MCV 90      MCH 29.1      MCHC 32.2      RDW 13.2      Platelets 245      Neutrophils % 64.9      Immature Granulocytes %, Automated 0.5      Lymphocytes % 23.9      Monocytes % 7.6       "Eosinophils % 2.4      Basophils % 0.7      Neutrophils Absolute 4.86      Immature Granulocytes Absolute, Automated 0.04      Lymphocytes Absolute 1.79      Monocytes Absolute 0.57      Eosinophils Absolute 0.18      Basophils Absolute 0.05     URINALYSIS WITH REFLEX CULTURE AND MICROSCOPIC    Narrative:     The following orders were created for panel order Urinalysis with Reflex Culture and Microscopic.  Procedure                               Abnormality         Status                     ---------                               -----------         ------                     Urinalysis with Reflex C...[[id number]]  Normal              Final result               Extra Urine Gray Tube[[id number]]                            In process                   Please view results for these tests on the individual orders.   EXTRA URINE GRAY TUBE     CT abdomen pelvis w IV contrast   Final Result   1. Liquid stool is present in the ascending and transverse colon   without inflammatory wall thickening. Correlate with any symptoms of   enterocolitis.   2. Scattered diverticula are present in the descending colon without   evidence of acute diverticulitis.   3. Mild bladder wall thickening is likely due to underdistention,   although correlation with urinalysis is recommended to exclude any   underlying cystitis.   4. Fat containing left inguinal hernia.        MACRO:   None.        Signed by: Andriy Stephen 5/15/2025 10:26 PM   Dictation workstation:   [id number]            ED Course & Medical Decision Making     Medications   sodium chloride 0.9 % bolus 1,000 mL (1,000 mL intravenous New Bag 5/15/25 2053)   ketorolac (Toradol) injection 15 mg (15 mg intravenous Given 5/15/25 2127)   iohexol (OMNIPaque) 350 mg iodine/mL solution 75 mL (75 mL intravenous Given 5/15/25 2144)     Heart Rate:  [62]   Temperature:  [36.8 °C (98.2 °F)]   Respirations:  [16]   BP: (129)/(81)   Height:  [172.7 cm (5' 8\")]   Weight:  [109 kg (240 " lb)]   Pulse Ox:  [96 %]    ED Course as of 05/15/25 2245   Thu May 15, 2025   2206 Patient signed out to me by previous provider.  Care was taken over at this time pending reevaluation after CT imaging. [CJ]   2243 Updated the patient on the results of the CT imaging.  Patient CT imaging did not show any acute abnormalities.  He had liquid stool throughout the colon but no signs of infection present.  Patient had diverticula present but no signs of acute diverticulitis.  He also had fat-containing inguinal hernia but had a repeat abdominal exam that was benign.  CBC CMP within normal limits urine was negative.  Lactate was negative.  At this time patient be discharged home to follow-up with GI as an outpatient.  Patient be given referral at this time.  He is encouraged to follow-up return here with worsening symptoms. [CJ]      ED Course User Index  [CJ] Franco Morin PA-C         Diagnoses as of 05/15/25 2245   Diarrhea, unspecified type         Diagnosis     1. Diarrhea, unspecified type        Disposition   Discharged    ED Prescriptions    None         Disclaimer: This note was dictated by speech recognition. Minor errors in transcription may be present. Please call if questions.     Franco Morin PA-C  05/15/25 2246

## 2025-05-16 NOTE — ED PROVIDER NOTES
"    HPI   Chief Complaint   Patient presents with    Flank Pain    Diarrhea       This is a 69-year-old  male, with a past medical history of hypertension, hyperlipidemia, bipolar disorder, PTSD, IBS, and obesity, that is presenting to the emergency room with complaints of diarrhea and abdominal bloating.  Patient reports his symptoms have been going on for the past week.  Denies any visible blood in his stool.  The patient is able to pass gas orally and rectally.  The patient was seen in the emergency room with similar symptoms about a month ago.  Had a moderate amount of air and stool noted in the colon.  A soapsuds enema was administered and he had good result.  The patient states he has not followed his primary care physician.  Denies any alteration in his diet.  He reports normal urine function.  He is not have any fevers, chills, or cold-like symptoms.  Denies any chest pain, shortness of breath, dizziness, palpitations, paresthesias, or focal weakness.      History provided by:  Patient   used: No                          Hudson Coma Scale Score: 15                  Patient History   Medical History[1]  Surgical History[2]  Family History[3]  Social History[4]    Physical Exam   Visit Vitals  /81   Pulse 62   Temp 36.8 °C (98.2 °F)   Resp 16   Ht 1.727 m (5' 8\")   Wt 109 kg (240 lb)   SpO2 96%   BMI 36.49 kg/m²   Smoking Status Former   BSA 2.29 m²      Physical Exam  Vitals and nursing note reviewed.   Constitutional:       Appearance: Normal appearance.   HENT:      Head: Normocephalic and atraumatic.      Right Ear: External ear normal.      Left Ear: External ear normal.      Nose: Nose normal.      Mouth/Throat:      Pharynx: Oropharynx is clear.   Eyes:      Extraocular Movements: Extraocular movements intact.      Conjunctiva/sclera: Conjunctivae normal.   Cardiovascular:      Rate and Rhythm: Normal rate and regular rhythm.      Pulses: Normal pulses.   Pulmonary: "      Effort: Pulmonary effort is normal.      Breath sounds: Normal breath sounds.   Abdominal:      General: Bowel sounds are normal.      Palpations: Abdomen is soft.      Tenderness: There is no abdominal tenderness.   Genitourinary:     Comments: No CVA tenderness or pubic pain.  Musculoskeletal:         General: Normal range of motion.   Skin:     General: Skin is warm.      Capillary Refill: Capillary refill takes less than 2 seconds.   Neurological:      General: No focal deficit present.      Mental Status: He is alert.   Psychiatric:         Mood and Affect: Mood normal.         No orders to display       Labs Reviewed - No data to display      ED Course & MDM            Medical Decision Making  The patient was seen and evaluated by the nurse practitioner, Quynh Gloria.  The patient is presenting to the emergency room with complaints of diarrhea and abdominal bloating.  Differential diagnosis includes gastroenteritis, gastritis, diverticulitis, ileus, obstruction, constipation, or other acute process.  Saline lock was established.  Laboratory studies were drawn with results as noted.  Patient was administered normal saline and ketorolac IV.  Laboratory studies were largely unremarkable.  The patient does not have any acute leukocytosis or lactic acidosis.  Routine urinalysis did not show any signs of infection.  The patient does not have any glaring electrolyte derangements or evidence of renal/liver dysfunction.  CT of the abdomen pelvis was ordered and pending at the time of dictation.  Care of the patient was endorsed to oncoming provider, ZACH Ram           Your medication list        ASK your doctor about these medications        Instructions Last Dose Given Next Dose Due   acetaminophen 325 mg tablet  Commonly known as: Tylenol           aspirin 81 mg chewable tablet           atorvastatin 40 mg tablet  Commonly known as: Lipitor      Take 1 tablet (40 mg) by mouth once daily.       buPROPion   mg 24 hr tablet  Commonly known as: Wellbutrin XL           busPIRone 30 mg tablet  Commonly known as: Buspar           carvedilol 6.25 mg tablet  Commonly known as: Coreg      TAKE ONE TABLET BY MOUTH TWICE DAILY       cholestyramine 4 gram packet  Commonly known as: Questran      Take 1 packet (4 g) by mouth once daily with breakfast. For irritable bowel syndrome       doxepin 10 mg capsule  Commonly known as: SINEquan      TAKE ONE CAPSULES BY MOUTH EVERY NIGHT AT BEDTIME AS NEEDED FOR SLEEP       lamoTRIgine 200 mg tablet  Commonly known as: LaMICtal           lamoTRIgine 25 mg tablet  Commonly known as: LaMICtal           lisinopriL-hydrochlorothiazide 20-12.5 mg tablet      TAKE ONE TABLET BY MOUTH EVERY DAY       mirtazapine 7.5 mg tablet  Commonly known as: Remeron           Multiple Vitamins tablet  Generic drug: multivitamin           sertraline 100 mg tablet  Commonly known as: Zoloft           topiramate 200 mg tablet  Commonly known as: Topamax      TAKE ONE TABLET BY MOUTH EVERY NIGHT AT BEDTIME       traZODone 50 mg tablet  Commonly known as: Desyrel           traZODone 300 mg tablet  Commonly known as: Desyrel                    Procedure       *This report was transcribed using voice recognition software.  Every effort was made to ensure accuracy; however, inadvertent computerized transcription errors may be present.*  Quynh LOPEZ-CNP  05/15/25           [1]   Past Medical History:  Diagnosis Date    Body mass index (BMI) 36.0-36.9, adult 12/01/2020    Body mass index (BMI) of 36.0 to 36.9 in adult    Body mass index (BMI) 39.0-39.9, adult 06/09/2020    Body mass index (BMI) of 39.0 to 39.9 in adult    Disorder of the skin and subcutaneous tissue, unspecified 12/28/2020    Lesion of skin of nose    Hypertension     Local infection of the skin and subcutaneous tissue, unspecified 12/01/2020    Skin infection    Other conditions influencing health status 12/09/2019    Body mass index  (BMI) greater than 35    Other hypertrophic disorders of the skin 12/10/2019    Skin tag, acquired    Pain in left hip 05/13/2020    Hip pain, left    Personal history of other diseases of the circulatory system     History of hypertension    Personal history of other diseases of the digestive system     History of irritable bowel syndrome    Personal history of other endocrine, nutritional and metabolic disease     History of hyperlipidemia    Personal history of other mental and behavioral disorders     History of bipolar disorder    Personal history of other mental and behavioral disorders     History of post traumatic stress disorder    Prediabetes     Prediabetes   [2]   Past Surgical History:  Procedure Laterality Date    TONSILLECTOMY  07/13/2017    Tonsillectomy    TOTAL HIP ARTHROPLASTY  07/13/2017    Hip Replacement Right   [3]   Family History  Problem Relation Name Age of Onset    Uterine cancer Mother      Other (cardiac disease) Other      Lung cancer Other      Other (malignant neoplasm) Other     [4]   Social History  Tobacco Use    Smoking status: Former     Types: Cigarettes     Passive exposure: Never    Smokeless tobacco: Never   Vaping Use    Vaping status: Never Used   Substance Use Topics    Alcohol use: Yes     Comment: rarely    Drug use: Never        JESSICA Cerna-JULIAN  05/15/25 6915     TAKE ONE TABLET BY MOUTH TWICE DAILY       cholestyramine 4 gram packet  Commonly known as: Questran      Take 1 packet (4 g) by mouth once daily with breakfast. For irritable bowel syndrome       doxepin 10 mg capsule  Commonly known as: SINEquan      TAKE ONE CAPSULES BY MOUTH EVERY NIGHT AT BEDTIME AS NEEDED FOR SLEEP       lamoTRIgine 200 mg tablet  Commonly known as: LaMICtal           lamoTRIgine 25 mg tablet  Commonly known as: LaMICtal           lisinopriL-hydrochlorothiazide 20-12.5 mg tablet      TAKE ONE TABLET BY MOUTH EVERY DAY       mirtazapine 7.5 mg tablet  Commonly known as: Remeron           Multiple Vitamins tablet  Generic drug: multivitamin           sertraline 100 mg tablet  Commonly known as: Zoloft           topiramate 200 mg tablet  Commonly known as: Topamax      TAKE ONE TABLET BY MOUTH EVERY NIGHT AT BEDTIME       traZODone 50 mg tablet  Commonly known as: Desyrel           traZODone 300 mg tablet  Commonly known as: Desyrel                    Procedure       *This report was transcribed using voice recognition software.  Every effort was made to ensure accuracy; however, inadvertent computerized transcription errors may be present.*  Quynh LOPEZ-JULIAN  05/19/25           JESSICA Cerna-JULIAN  05/15/25 2205       [1]   Past Medical History:  Diagnosis Date    Body mass index (BMI) 36.0-36.9, adult 12/01/2020    Body mass index (BMI) of 36.0 to 36.9 in adult    Body mass index (BMI) 39.0-39.9, adult 06/09/2020    Body mass index (BMI) of 39.0 to 39.9 in adult    Disorder of the skin and subcutaneous tissue, unspecified 12/28/2020    Lesion of skin of nose    Hypertension     Local infection of the skin and subcutaneous tissue, unspecified 12/01/2020    Skin infection    Other conditions influencing health status 12/09/2019    Body mass index (BMI) greater than 35    Other hypertrophic disorders of the skin 12/10/2019    Skin tag, acquired    Pain in left hip 05/13/2020    Hip  pain, left    Personal history of other diseases of the circulatory system     History of hypertension    Personal history of other diseases of the digestive system     History of irritable bowel syndrome    Personal history of other endocrine, nutritional and metabolic disease     History of hyperlipidemia    Personal history of other mental and behavioral disorders     History of bipolar disorder    Personal history of other mental and behavioral disorders     History of post traumatic stress disorder    Prediabetes     Prediabetes   [2]   Past Surgical History:  Procedure Laterality Date    TONSILLECTOMY  07/13/2017    Tonsillectomy    TOTAL HIP ARTHROPLASTY  07/13/2017    Hip Replacement Right   [3]   Family History  Problem Relation Name Age of Onset    Uterine cancer Mother      Other (cardiac disease) Other      Lung cancer Other      Other (malignant neoplasm) Other     [4]   Social History  Tobacco Use    Smoking status: Former     Types: Cigarettes     Passive exposure: Never    Smokeless tobacco: Never   Vaping Use    Vaping status: Never Used   Substance Use Topics    Alcohol use: Yes     Comment: rarely    Drug use: Never        JESSICA Cerna-CNP  05/19/25 1026

## 2025-05-16 NOTE — DISCHARGE INSTRUCTIONS
Please follow-up with your primary care provider 1 to 2 days, or return to the emergency department immediately with any worsening symptoms.    If any medications were prescribed please take them as instructed.    Please follow-up with GI with referral provided

## 2025-05-27 PROBLEM — F31.32 BIPOLAR DISORDER, CURRENT EPISODE DEPRESSED, MODERATE (MULTI): Chronic | Status: ACTIVE | Noted: 2019-02-14

## 2025-05-27 PROBLEM — Z23 NEED FOR PNEUMOCOCCAL 20-VALENT CONJUGATE VACCINATION: Status: RESOLVED | Noted: 2025-02-03 | Resolved: 2025-05-27

## 2025-06-04 ENCOUNTER — TELEPHONE (OUTPATIENT)
Dept: PRIMARY CARE | Facility: CLINIC | Age: 70
End: 2025-06-04
Payer: COMMERCIAL

## 2025-06-04 NOTE — TELEPHONE ENCOUNTER
I tried doing PA for Xifaxin for his diarrhea but patient not found. Can you ask patient for pharmacy benefit card info so I can try again?    Thanks,  Elaina Kirk, DO

## 2025-06-06 NOTE — TELEPHONE ENCOUNTER
Left message with patient's contact Maryam ([facility] VM line) asking if they could let him know to call us with his current insurance card information. Patient has no phone number listed but does have an email listed. I will send an email as well.

## 2025-08-04 DIAGNOSIS — G47.00 INSOMNIA, UNSPECIFIED TYPE: ICD-10-CM

## 2025-08-04 RX ORDER — DOXEPIN HYDROCHLORIDE 10 MG/1
CAPSULE ORAL
Qty: 30 CAPSULE | Refills: 2 | Status: SHIPPED | OUTPATIENT
Start: 2025-08-04

## 2025-12-01 ENCOUNTER — APPOINTMENT (OUTPATIENT)
Dept: PRIMARY CARE | Facility: CLINIC | Age: 70
End: 2025-12-01
Payer: COMMERCIAL

## 2026-04-02 ENCOUNTER — APPOINTMENT (OUTPATIENT)
Dept: PRIMARY CARE | Facility: CLINIC | Age: 71
End: 2026-04-02
Payer: COMMERCIAL